# Patient Record
Sex: FEMALE | Race: WHITE | Employment: OTHER | URBAN - METROPOLITAN AREA
[De-identification: names, ages, dates, MRNs, and addresses within clinical notes are randomized per-mention and may not be internally consistent; named-entity substitution may affect disease eponyms.]

---

## 2020-03-20 ENCOUNTER — ANESTHESIA (OUTPATIENT)
Dept: ENDOSCOPY | Age: 55
DRG: 445 | End: 2020-03-20
Payer: MEDICARE

## 2020-03-20 ENCOUNTER — APPOINTMENT (OUTPATIENT)
Dept: GENERAL RADIOLOGY | Age: 55
DRG: 445 | End: 2020-03-20
Attending: INTERNAL MEDICINE
Payer: MEDICARE

## 2020-03-20 ENCOUNTER — HOSPITAL ENCOUNTER (INPATIENT)
Age: 55
LOS: 1 days | Discharge: HOME | DRG: 445 | End: 2020-03-21
Attending: INTERNAL MEDICINE | Admitting: INTERNAL MEDICINE
Payer: MEDICARE

## 2020-03-20 ENCOUNTER — ANESTHESIA EVENT (OUTPATIENT)
Dept: ENDOSCOPY | Age: 55
DRG: 445 | End: 2020-03-20
Payer: MEDICARE

## 2020-03-20 ENCOUNTER — HOSPITAL ENCOUNTER (INPATIENT)
Age: 55
LOS: 1 days | Discharge: STILL ADMITTED OR ADMINISTRATIVE | DRG: 445 | End: 2020-03-20
Attending: INTERNAL MEDICINE | Admitting: INTERNAL MEDICINE
Payer: MEDICARE

## 2020-03-20 VITALS
TEMPERATURE: 98.8 F | RESPIRATION RATE: 16 BRPM | OXYGEN SATURATION: 99 % | HEIGHT: 68 IN | DIASTOLIC BLOOD PRESSURE: 91 MMHG | BODY MASS INDEX: 30.01 KG/M2 | HEART RATE: 79 BPM | WEIGHT: 198 LBS | SYSTOLIC BLOOD PRESSURE: 162 MMHG

## 2020-03-20 VITALS — TEMPERATURE: 96.8 F | OXYGEN SATURATION: 99 % | DIASTOLIC BLOOD PRESSURE: 99 MMHG | SYSTOLIC BLOOD PRESSURE: 153 MMHG

## 2020-03-20 PROBLEM — R10.9 ACUTE ABDOMINAL PAIN: Status: ACTIVE | Noted: 2020-03-20

## 2020-03-20 PROBLEM — R10.9 ABDOMINAL PAIN: Status: ACTIVE | Noted: 2020-03-20

## 2020-03-20 LAB
ALBUMIN SERPL-MCNC: 2.5 G/DL (ref 3.4–5)
ALP BLD-CCNC: 1933 U/L (ref 40–129)
ALT SERPL-CCNC: 124 U/L (ref 10–40)
AMYLASE: 25 U/L (ref 25–115)
ANION GAP SERPL CALCULATED.3IONS-SCNC: 13 MMOL/L (ref 3–16)
AST SERPL-CCNC: 137 U/L (ref 15–37)
BILIRUB SERPL-MCNC: 7 MG/DL (ref 0–1)
BILIRUBIN DIRECT: 5.8 MG/DL (ref 0–0.3)
BILIRUBIN, INDIRECT: 1.2 MG/DL (ref 0–1)
BUN BLDV-MCNC: 15 MG/DL (ref 7–20)
CALCIUM SERPL-MCNC: 8 MG/DL (ref 8.3–10.6)
CHLORIDE BLD-SCNC: 99 MMOL/L (ref 99–110)
CO2: 26 MMOL/L (ref 21–32)
CREAT SERPL-MCNC: 0.6 MG/DL (ref 0.6–1.1)
GFR AFRICAN AMERICAN: >60
GFR NON-AFRICAN AMERICAN: >60
GLUCOSE BLD-MCNC: 121 MG/DL (ref 70–99)
HCT VFR BLD CALC: 25.2 % (ref 36–48)
HEMOGLOBIN: 8.3 G/DL (ref 12–16)
LIPASE: 27 U/L (ref 13–60)
MCH RBC QN AUTO: 30.9 PG (ref 26–34)
MCHC RBC AUTO-ENTMCNC: 32.7 G/DL (ref 31–36)
MCV RBC AUTO: 94.6 FL (ref 80–100)
PDW BLD-RTO: 20.1 % (ref 12.4–15.4)
PLATELET # BLD: 308 K/UL (ref 135–450)
PMV BLD AUTO: 8.9 FL (ref 5–10.5)
POTASSIUM SERPL-SCNC: 3.1 MMOL/L (ref 3.5–5.1)
RBC # BLD: 2.67 M/UL (ref 4–5.2)
SODIUM BLD-SCNC: 138 MMOL/L (ref 136–145)
TOTAL PROTEIN: 5.6 G/DL (ref 6.4–8.2)
WBC # BLD: 4.7 K/UL (ref 4–11)

## 2020-03-20 PROCEDURE — 2720000010 HC SURG SUPPLY STERILE: Performed by: INTERNAL MEDICINE

## 2020-03-20 PROCEDURE — 3609015100 HC ERCP STENT PLACEMENT BILIARY/PANCREATIC DUCT: Performed by: INTERNAL MEDICINE

## 2020-03-20 PROCEDURE — 6360000002 HC RX W HCPCS: Performed by: NURSE ANESTHETIST, CERTIFIED REGISTERED

## 2020-03-20 PROCEDURE — 3609014200 HC ERCP W/BIOPSY SINGLE/MULTIPLE: Performed by: INTERNAL MEDICINE

## 2020-03-20 PROCEDURE — 82150 ASSAY OF AMYLASE: CPT

## 2020-03-20 PROCEDURE — 6360000004 HC RX CONTRAST MEDICATION: Performed by: INTERNAL MEDICINE

## 2020-03-20 PROCEDURE — 3700000001 HC ADD 15 MINUTES (ANESTHESIA): Performed by: INTERNAL MEDICINE

## 2020-03-20 PROCEDURE — BF101ZZ FLUOROSCOPY OF BILE DUCTS USING LOW OSMOLAR CONTRAST: ICD-10-PCS | Performed by: INTERNAL MEDICINE

## 2020-03-20 PROCEDURE — 74330 X-RAY BILE/PANC ENDOSCOPY: CPT

## 2020-03-20 PROCEDURE — 36415 COLL VENOUS BLD VENIPUNCTURE: CPT

## 2020-03-20 PROCEDURE — 88104 CYTOPATH FL NONGYN SMEARS: CPT

## 2020-03-20 PROCEDURE — 6370000000 HC RX 637 (ALT 250 FOR IP): Performed by: INTERNAL MEDICINE

## 2020-03-20 PROCEDURE — 83690 ASSAY OF LIPASE: CPT

## 2020-03-20 PROCEDURE — 88112 CYTOPATH CELL ENHANCE TECH: CPT

## 2020-03-20 PROCEDURE — 88305 TISSUE EXAM BY PATHOLOGIST: CPT

## 2020-03-20 PROCEDURE — 3700000000 HC ANESTHESIA ATTENDED CARE: Performed by: INTERNAL MEDICINE

## 2020-03-20 PROCEDURE — 1200000000 HC SEMI PRIVATE

## 2020-03-20 PROCEDURE — 7100000001 HC PACU RECOVERY - ADDTL 15 MIN: Performed by: INTERNAL MEDICINE

## 2020-03-20 PROCEDURE — 2500000003 HC RX 250 WO HCPCS: Performed by: NURSE ANESTHETIST, CERTIFIED REGISTERED

## 2020-03-20 PROCEDURE — 2580000003 HC RX 258: Performed by: NURSE ANESTHETIST, CERTIFIED REGISTERED

## 2020-03-20 PROCEDURE — 85027 COMPLETE CBC AUTOMATED: CPT

## 2020-03-20 PROCEDURE — 6360000002 HC RX W HCPCS: Performed by: FAMILY MEDICINE

## 2020-03-20 PROCEDURE — 6370000000 HC RX 637 (ALT 250 FOR IP): Performed by: NURSE ANESTHETIST, CERTIFIED REGISTERED

## 2020-03-20 PROCEDURE — C2617 STENT, NON-COR, TEM W/O DEL: HCPCS | Performed by: INTERNAL MEDICINE

## 2020-03-20 PROCEDURE — 94760 N-INVAS EAR/PLS OXIMETRY 1: CPT

## 2020-03-20 PROCEDURE — C1769 GUIDE WIRE: HCPCS | Performed by: INTERNAL MEDICINE

## 2020-03-20 PROCEDURE — 6370000000 HC RX 637 (ALT 250 FOR IP): Performed by: FAMILY MEDICINE

## 2020-03-20 PROCEDURE — 2580000003 HC RX 258: Performed by: FAMILY MEDICINE

## 2020-03-20 PROCEDURE — 80048 BASIC METABOLIC PNL TOTAL CA: CPT

## 2020-03-20 PROCEDURE — 6360000002 HC RX W HCPCS

## 2020-03-20 PROCEDURE — 80076 HEPATIC FUNCTION PANEL: CPT

## 2020-03-20 PROCEDURE — 6360000002 HC RX W HCPCS: Performed by: INTERNAL MEDICINE

## 2020-03-20 PROCEDURE — 2709999900 HC NON-CHARGEABLE SUPPLY: Performed by: INTERNAL MEDICINE

## 2020-03-20 PROCEDURE — 6360000002 HC RX W HCPCS: Performed by: ANESTHESIOLOGY

## 2020-03-20 PROCEDURE — 74019 RADEX ABDOMEN 2 VIEWS: CPT

## 2020-03-20 PROCEDURE — 7100000000 HC PACU RECOVERY - FIRST 15 MIN: Performed by: INTERNAL MEDICINE

## 2020-03-20 PROCEDURE — C1726 CATH, BAL DIL, NON-VASCULAR: HCPCS | Performed by: INTERNAL MEDICINE

## 2020-03-20 PROCEDURE — 3609018900 HC ERCP: Performed by: INTERNAL MEDICINE

## 2020-03-20 PROCEDURE — 0F794DZ DILATION OF COMMON BILE DUCT WITH INTRALUMINAL DEVICE, PERCUTANEOUS ENDOSCOPIC APPROACH: ICD-10-PCS | Performed by: INTERNAL MEDICINE

## 2020-03-20 DEVICE — BILIARY STENT
Type: IMPLANTABLE DEVICE | Status: FUNCTIONAL
Brand: ADVANIX™ BILIARY

## 2020-03-20 RX ORDER — LIDOCAINE HYDROCHLORIDE 20 MG/ML
INJECTION, SOLUTION INFILTRATION; PERINEURAL PRN
Status: DISCONTINUED | OUTPATIENT
Start: 2020-03-20 | End: 2020-03-20 | Stop reason: SDUPTHER

## 2020-03-20 RX ORDER — LORAZEPAM 0.5 MG/1
0.5 TABLET ORAL EVERY 6 HOURS PRN
Status: DISCONTINUED | OUTPATIENT
Start: 2020-03-20 | End: 2020-03-21 | Stop reason: HOSPADM

## 2020-03-20 RX ORDER — HYDROCORTISONE 10 MG/1
20 TABLET ORAL 2 TIMES DAILY
Status: DISCONTINUED | OUTPATIENT
Start: 2020-03-20 | End: 2020-03-21 | Stop reason: HOSPADM

## 2020-03-20 RX ORDER — ONDANSETRON 4 MG/1
4 TABLET, FILM COATED ORAL EVERY 8 HOURS PRN
COMMUNITY

## 2020-03-20 RX ORDER — VALACYCLOVIR HYDROCHLORIDE 500 MG/1
500 TABLET, FILM COATED ORAL 2 TIMES DAILY
COMMUNITY

## 2020-03-20 RX ORDER — POLYETHYLENE GLYCOL 3350 17 G/17G
17 POWDER, FOR SOLUTION ORAL DAILY PRN
Status: DISCONTINUED | OUTPATIENT
Start: 2020-03-20 | End: 2020-03-21 | Stop reason: HOSPADM

## 2020-03-20 RX ORDER — ACYCLOVIR 200 MG/1
400 CAPSULE ORAL 3 TIMES DAILY
Status: DISCONTINUED | OUTPATIENT
Start: 2020-03-20 | End: 2020-03-21 | Stop reason: HOSPADM

## 2020-03-20 RX ORDER — SODIUM CHLORIDE 9 MG/ML
INJECTION, SOLUTION INTRAVENOUS CONTINUOUS PRN
Status: DISCONTINUED | OUTPATIENT
Start: 2020-03-20 | End: 2020-03-20 | Stop reason: SDUPTHER

## 2020-03-20 RX ORDER — FAMOTIDINE 20 MG/1
40 TABLET, FILM COATED ORAL DAILY
Status: DISCONTINUED | OUTPATIENT
Start: 2020-03-21 | End: 2020-03-21 | Stop reason: HOSPADM

## 2020-03-20 RX ORDER — ACETAMINOPHEN 325 MG/1
650 TABLET ORAL EVERY 6 HOURS PRN
Status: DISCONTINUED | OUTPATIENT
Start: 2020-03-20 | End: 2020-03-21 | Stop reason: HOSPADM

## 2020-03-20 RX ORDER — PROMETHAZINE HYDROCHLORIDE 25 MG/ML
INJECTION, SOLUTION INTRAMUSCULAR; INTRAVENOUS
Status: DISCONTINUED
Start: 2020-03-20 | End: 2020-03-20 | Stop reason: HOSPADM

## 2020-03-20 RX ORDER — VALACYCLOVIR HYDROCHLORIDE 500 MG/1
500 TABLET, FILM COATED ORAL 2 TIMES DAILY
Status: DISCONTINUED | OUTPATIENT
Start: 2020-03-20 | End: 2020-03-20 | Stop reason: CLARIF

## 2020-03-20 RX ORDER — PROMETHAZINE HYDROCHLORIDE 25 MG/ML
6.25 INJECTION, SOLUTION INTRAMUSCULAR; INTRAVENOUS
Status: DISCONTINUED | OUTPATIENT
Start: 2020-03-20 | End: 2020-03-20 | Stop reason: HOSPADM

## 2020-03-20 RX ORDER — FENTANYL CITRATE 50 UG/ML
INJECTION, SOLUTION INTRAMUSCULAR; INTRAVENOUS PRN
Status: DISCONTINUED | OUTPATIENT
Start: 2020-03-20 | End: 2020-03-20 | Stop reason: SDUPTHER

## 2020-03-20 RX ORDER — SODIUM CHLORIDE 9 MG/ML
INJECTION INTRAVENOUS
Status: DISCONTINUED
Start: 2020-03-20 | End: 2020-03-20 | Stop reason: HOSPADM

## 2020-03-20 RX ORDER — FAMOTIDINE 40 MG/1
40 TABLET, FILM COATED ORAL DAILY
COMMUNITY

## 2020-03-20 RX ORDER — LANOLIN ALCOHOL/MO/W.PET/CERES
20 CREAM (GRAM) TOPICAL NIGHTLY PRN
Status: DISCONTINUED | OUTPATIENT
Start: 2020-03-20 | End: 2020-03-21 | Stop reason: HOSPADM

## 2020-03-20 RX ORDER — MIDODRINE HYDROCHLORIDE 5 MG/1
2.5 TABLET ORAL DAILY
Status: DISCONTINUED | OUTPATIENT
Start: 2020-03-21 | End: 2020-03-21 | Stop reason: HOSPADM

## 2020-03-20 RX ORDER — PROMETHAZINE HYDROCHLORIDE 25 MG/1
12.5 TABLET ORAL EVERY 6 HOURS PRN
Status: DISCONTINUED | OUTPATIENT
Start: 2020-03-20 | End: 2020-03-21 | Stop reason: HOSPADM

## 2020-03-20 RX ORDER — ONDANSETRON 4 MG/1
4 TABLET, ORALLY DISINTEGRATING ORAL EVERY 8 HOURS PRN
Status: DISCONTINUED | OUTPATIENT
Start: 2020-03-20 | End: 2020-03-21 | Stop reason: HOSPADM

## 2020-03-20 RX ORDER — HYDROCORTISONE 20 MG/1
20 TABLET ORAL 2 TIMES DAILY
COMMUNITY

## 2020-03-20 RX ORDER — CIPROFLOXACIN 2 MG/ML
400 INJECTION, SOLUTION INTRAVENOUS ONCE
Status: COMPLETED | OUTPATIENT
Start: 2020-03-20 | End: 2020-03-20

## 2020-03-20 RX ORDER — DEXAMETHASONE SODIUM PHOSPHATE 4 MG/ML
INJECTION, SOLUTION INTRA-ARTICULAR; INTRALESIONAL; INTRAMUSCULAR; INTRAVENOUS; SOFT TISSUE PRN
Status: DISCONTINUED | OUTPATIENT
Start: 2020-03-20 | End: 2020-03-20 | Stop reason: SDUPTHER

## 2020-03-20 RX ORDER — CIPROFLOXACIN 500 MG/1
500 TABLET, FILM COATED ORAL 2 TIMES DAILY
Qty: 14 TABLET | Refills: 0 | Status: ON HOLD | OUTPATIENT
Start: 2020-03-20 | End: 2020-03-21 | Stop reason: HOSPADM

## 2020-03-20 RX ORDER — MIDAZOLAM HYDROCHLORIDE 1 MG/ML
1 INJECTION INTRAMUSCULAR; INTRAVENOUS EVERY 10 MIN PRN
Status: DISCONTINUED | OUTPATIENT
Start: 2020-03-20 | End: 2020-03-20 | Stop reason: HOSPADM

## 2020-03-20 RX ORDER — MIDODRINE HYDROCHLORIDE 2.5 MG/1
2.5 TABLET ORAL DAILY
COMMUNITY

## 2020-03-20 RX ORDER — VENLAFAXINE HYDROCHLORIDE 37.5 MG/1
75 CAPSULE, EXTENDED RELEASE ORAL 2 TIMES DAILY
Status: DISCONTINUED | OUTPATIENT
Start: 2020-03-20 | End: 2020-03-21 | Stop reason: HOSPADM

## 2020-03-20 RX ORDER — HYDROMORPHONE HCL 110MG/55ML
PATIENT CONTROLLED ANALGESIA SYRINGE INTRAVENOUS
Status: DISCONTINUED
Start: 2020-03-20 | End: 2020-03-20 | Stop reason: HOSPADM

## 2020-03-20 RX ORDER — ONDANSETRON 2 MG/ML
4 INJECTION INTRAMUSCULAR; INTRAVENOUS EVERY 6 HOURS PRN
Status: DISCONTINUED | OUTPATIENT
Start: 2020-03-20 | End: 2020-03-21 | Stop reason: HOSPADM

## 2020-03-20 RX ORDER — CIPROFLOXACIN 2 MG/ML
400 INJECTION, SOLUTION INTRAVENOUS EVERY 12 HOURS
Status: DISCONTINUED | OUTPATIENT
Start: 2020-03-20 | End: 2020-03-21 | Stop reason: HOSPADM

## 2020-03-20 RX ORDER — PROPOFOL 10 MG/ML
INJECTION, EMULSION INTRAVENOUS PRN
Status: DISCONTINUED | OUTPATIENT
Start: 2020-03-20 | End: 2020-03-20 | Stop reason: SDUPTHER

## 2020-03-20 RX ORDER — ACETAMINOPHEN 650 MG/1
650 SUPPOSITORY RECTAL EVERY 6 HOURS PRN
Status: DISCONTINUED | OUTPATIENT
Start: 2020-03-20 | End: 2020-03-21 | Stop reason: HOSPADM

## 2020-03-20 RX ORDER — MIDAZOLAM HYDROCHLORIDE 1 MG/ML
2 INJECTION INTRAMUSCULAR; INTRAVENOUS ONCE
Status: DISCONTINUED | OUTPATIENT
Start: 2020-03-20 | End: 2020-03-20 | Stop reason: HOSPADM

## 2020-03-20 RX ORDER — ONDANSETRON 2 MG/ML
INJECTION INTRAMUSCULAR; INTRAVENOUS PRN
Status: DISCONTINUED | OUTPATIENT
Start: 2020-03-20 | End: 2020-03-20 | Stop reason: SDUPTHER

## 2020-03-20 RX ORDER — MORPHINE SULFATE 4 MG/ML
4 INJECTION, SOLUTION INTRAMUSCULAR; INTRAVENOUS EVERY 4 HOURS PRN
Status: DISCONTINUED | OUTPATIENT
Start: 2020-03-20 | End: 2020-03-21 | Stop reason: HOSPADM

## 2020-03-20 RX ORDER — LORAZEPAM 0.5 MG/1
0.5 TABLET ORAL EVERY 6 HOURS PRN
COMMUNITY

## 2020-03-20 RX ORDER — HYDROMORPHONE HCL 110MG/55ML
0.5 PATIENT CONTROLLED ANALGESIA SYRINGE INTRAVENOUS EVERY 5 MIN PRN
Status: DISCONTINUED | OUTPATIENT
Start: 2020-03-20 | End: 2020-03-20 | Stop reason: HOSPADM

## 2020-03-20 RX ORDER — SUCCINYLCHOLINE CHLORIDE 20 MG/ML
INJECTION INTRAMUSCULAR; INTRAVENOUS PRN
Status: DISCONTINUED | OUTPATIENT
Start: 2020-03-20 | End: 2020-03-20 | Stop reason: SDUPTHER

## 2020-03-20 RX ORDER — MIDAZOLAM HYDROCHLORIDE 1 MG/ML
INJECTION INTRAMUSCULAR; INTRAVENOUS
Status: COMPLETED
Start: 2020-03-20 | End: 2020-03-20

## 2020-03-20 RX ORDER — SODIUM CHLORIDE 0.9 % (FLUSH) 0.9 %
10 SYRINGE (ML) INJECTION EVERY 12 HOURS SCHEDULED
Status: DISCONTINUED | OUTPATIENT
Start: 2020-03-20 | End: 2020-03-21 | Stop reason: HOSPADM

## 2020-03-20 RX ORDER — SODIUM CHLORIDE 0.9 % (FLUSH) 0.9 %
10 SYRINGE (ML) INJECTION PRN
Status: DISCONTINUED | OUTPATIENT
Start: 2020-03-20 | End: 2020-03-21 | Stop reason: HOSPADM

## 2020-03-20 RX ORDER — VENLAFAXINE 75 MG/1
75 TABLET ORAL 2 TIMES DAILY
COMMUNITY

## 2020-03-20 RX ORDER — ALBUTEROL SULFATE 90 UG/1
AEROSOL, METERED RESPIRATORY (INHALATION) PRN
Status: DISCONTINUED | OUTPATIENT
Start: 2020-03-20 | End: 2020-03-20 | Stop reason: SDUPTHER

## 2020-03-20 RX ADMIN — CIPROFLOXACIN 400 MG: 2 INJECTION, SOLUTION INTRAVENOUS at 22:21

## 2020-03-20 RX ADMIN — MORPHINE SULFATE 4 MG: 4 INJECTION INTRAVENOUS at 21:13

## 2020-03-20 RX ADMIN — DEXAMETHASONE SODIUM PHOSPHATE 4 MG: 4 INJECTION, SOLUTION INTRAMUSCULAR; INTRAVENOUS at 10:01

## 2020-03-20 RX ADMIN — HYDROMORPHONE HYDROCHLORIDE 0.5 MG: 2 INJECTION, SOLUTION INTRAMUSCULAR; INTRAVENOUS; SUBCUTANEOUS at 13:08

## 2020-03-20 RX ADMIN — HYDROMORPHONE HYDROCHLORIDE 0.5 MG: 2 INJECTION, SOLUTION INTRAMUSCULAR; INTRAVENOUS; SUBCUTANEOUS at 12:37

## 2020-03-20 RX ADMIN — PROPOFOL 50 MG: 10 INJECTION, EMULSION INTRAVENOUS at 10:03

## 2020-03-20 RX ADMIN — ONDANSETRON 4 MG: 2 INJECTION INTRAMUSCULAR; INTRAVENOUS at 21:19

## 2020-03-20 RX ADMIN — ENOXAPARIN SODIUM 40 MG: 40 INJECTION SUBCUTANEOUS at 21:13

## 2020-03-20 RX ADMIN — GLUCAGON HYDROCHLORIDE 0.5 MG: KIT at 10:21

## 2020-03-20 RX ADMIN — PROPOFOL 50 MG: 10 INJECTION, EMULSION INTRAVENOUS at 10:31

## 2020-03-20 RX ADMIN — Medication 10 ML: at 21:14

## 2020-03-20 RX ADMIN — VENLAFAXINE HYDROCHLORIDE 75 MG: 37.5 CAPSULE, EXTENDED RELEASE ORAL at 21:14

## 2020-03-20 RX ADMIN — HYDROCORTISONE 20 MG: 10 TABLET ORAL at 21:13

## 2020-03-20 RX ADMIN — MIDAZOLAM 1 MG: 1 INJECTION INTRAMUSCULAR; INTRAVENOUS at 12:12

## 2020-03-20 RX ADMIN — PROPOFOL 50 MG: 10 INJECTION, EMULSION INTRAVENOUS at 10:18

## 2020-03-20 RX ADMIN — LORAZEPAM 0.5 MG: 0.5 TABLET ORAL at 22:19

## 2020-03-20 RX ADMIN — SODIUM CHLORIDE: 9 INJECTION, SOLUTION INTRAVENOUS at 09:26

## 2020-03-20 RX ADMIN — SODIUM CHLORIDE: 9 INJECTION, SOLUTION INTRAVENOUS at 09:55

## 2020-03-20 RX ADMIN — HYDROMORPHONE HYDROCHLORIDE 0.5 MG: 2 INJECTION, SOLUTION INTRAMUSCULAR; INTRAVENOUS; SUBCUTANEOUS at 14:35

## 2020-03-20 RX ADMIN — ONDANSETRON 4 MG: 2 INJECTION INTRAMUSCULAR; INTRAVENOUS at 10:02

## 2020-03-20 RX ADMIN — LIDOCAINE HYDROCHLORIDE 100 MG: 20 INJECTION, SOLUTION INFILTRATION; PERINEURAL at 09:41

## 2020-03-20 RX ADMIN — ALBUTEROL SULFATE 5 PUFF: 90 AEROSOL, METERED RESPIRATORY (INHALATION) at 11:07

## 2020-03-20 RX ADMIN — CIPROFLOXACIN 400 MG: 2 INJECTION, SOLUTION INTRAVENOUS at 11:42

## 2020-03-20 RX ADMIN — PROPOFOL 50 MG: 10 INJECTION, EMULSION INTRAVENOUS at 09:43

## 2020-03-20 RX ADMIN — FENTANYL CITRATE 50 MCG: 50 INJECTION, SOLUTION INTRAMUSCULAR; INTRAVENOUS at 10:21

## 2020-03-20 RX ADMIN — PROMETHAZINE HYDROCHLORIDE 6.25 MG: 25 INJECTION INTRAMUSCULAR; INTRAVENOUS at 11:58

## 2020-03-20 RX ADMIN — MELATONIN TAB 3 MG 19.5 MG: 3 TAB at 21:13

## 2020-03-20 RX ADMIN — ACYCLOVIR 400 MG: 200 CAPSULE ORAL at 21:12

## 2020-03-20 RX ADMIN — MIDAZOLAM 1 MG: 1 INJECTION INTRAMUSCULAR; INTRAVENOUS at 12:10

## 2020-03-20 RX ADMIN — FENTANYL CITRATE 50 MCG: 50 INJECTION, SOLUTION INTRAMUSCULAR; INTRAVENOUS at 09:41

## 2020-03-20 RX ADMIN — PROPOFOL 150 MG: 10 INJECTION, EMULSION INTRAVENOUS at 09:42

## 2020-03-20 RX ADMIN — SUCCINYLCHOLINE CHLORIDE 100 MG: 20 INJECTION, SOLUTION INTRAMUSCULAR; INTRAVENOUS at 09:44

## 2020-03-20 RX ADMIN — PROPOFOL 50 MG: 10 INJECTION, EMULSION INTRAVENOUS at 10:41

## 2020-03-20 RX ADMIN — ACETAMINOPHEN 650 MG: 325 TABLET, FILM COATED ORAL at 22:19

## 2020-03-20 ASSESSMENT — PULMONARY FUNCTION TESTS
PIF_VALUE: 7
PIF_VALUE: 34
PIF_VALUE: 6
PIF_VALUE: 32
PIF_VALUE: 35
PIF_VALUE: 10
PIF_VALUE: 35
PIF_VALUE: 35
PIF_VALUE: 34
PIF_VALUE: 35
PIF_VALUE: 5
PIF_VALUE: 32
PIF_VALUE: 36
PIF_VALUE: 36
PIF_VALUE: 5
PIF_VALUE: 38
PIF_VALUE: 16
PIF_VALUE: 5
PIF_VALUE: 32
PIF_VALUE: 31
PIF_VALUE: 30
PIF_VALUE: 33
PIF_VALUE: 36
PIF_VALUE: 25
PIF_VALUE: 37
PIF_VALUE: 28
PIF_VALUE: 22
PIF_VALUE: 30
PIF_VALUE: 1
PIF_VALUE: 33
PIF_VALUE: 32
PIF_VALUE: 33
PIF_VALUE: 27
PIF_VALUE: 18
PIF_VALUE: 29
PIF_VALUE: 33
PIF_VALUE: 7
PIF_VALUE: 34
PIF_VALUE: 31
PIF_VALUE: 35
PIF_VALUE: 34
PIF_VALUE: 2
PIF_VALUE: 34
PIF_VALUE: 5
PIF_VALUE: 38
PIF_VALUE: 34
PIF_VALUE: 38
PIF_VALUE: 33
PIF_VALUE: 32
PIF_VALUE: 33
PIF_VALUE: 5
PIF_VALUE: 25
PIF_VALUE: 38
PIF_VALUE: 37
PIF_VALUE: 27
PIF_VALUE: 34
PIF_VALUE: 38
PIF_VALUE: 4
PIF_VALUE: 25
PIF_VALUE: 35
PIF_VALUE: 35
PIF_VALUE: 31
PIF_VALUE: 32
PIF_VALUE: 34
PIF_VALUE: 31
PIF_VALUE: 5
PIF_VALUE: 34
PIF_VALUE: 34
PIF_VALUE: 35
PIF_VALUE: 7
PIF_VALUE: 21
PIF_VALUE: 36
PIF_VALUE: 36
PIF_VALUE: 31
PIF_VALUE: 34
PIF_VALUE: 36
PIF_VALUE: 36
PIF_VALUE: 5
PIF_VALUE: 4
PIF_VALUE: 34
PIF_VALUE: 33
PIF_VALUE: 3
PIF_VALUE: 38
PIF_VALUE: 36
PIF_VALUE: 36
PIF_VALUE: 33
PIF_VALUE: 40
PIF_VALUE: 32
PIF_VALUE: 0
PIF_VALUE: 36
PIF_VALUE: 32
PIF_VALUE: 34
PIF_VALUE: 6
PIF_VALUE: 34
PIF_VALUE: 34
PIF_VALUE: 35
PIF_VALUE: 33
PIF_VALUE: 8
PIF_VALUE: 29
PIF_VALUE: 5
PIF_VALUE: 36
PIF_VALUE: 7
PIF_VALUE: 28
PIF_VALUE: 4
PIF_VALUE: 33
PIF_VALUE: 6
PIF_VALUE: 30
PIF_VALUE: 29

## 2020-03-20 ASSESSMENT — PAIN SCALES - GENERAL
PAINLEVEL_OUTOF10: 9
PAINLEVEL_OUTOF10: 9
PAINLEVEL_OUTOF10: 7
PAINLEVEL_OUTOF10: 8
PAINLEVEL_OUTOF10: 2
PAINLEVEL_OUTOF10: 5

## 2020-03-20 ASSESSMENT — PAIN DESCRIPTION - PAIN TYPE: TYPE: ACUTE PAIN

## 2020-03-20 ASSESSMENT — PAIN DESCRIPTION - LOCATION: LOCATION: ABDOMEN

## 2020-03-20 NOTE — H&P
[address] [place] [address]   Pre-operative History and Physical    Patient: Brianna Woo  : 1965  CSN:     History Obtained From:  patient, electronic medical record    HISTORY OF PRESENT ILLNESS:    The patient is a 63 y.o. female with significant past medical history of HTN who presents with Obstructive jaundice with liver mass encroaching on portal confluence on MRI. Here for dx and palliation. Past Medical History:        Diagnosis Date    Cancer ([facility] [address] 2013    Breast R side    Hypertension       Past Surgical History:        Procedure Laterality Date    BREAST BIOPSY Bilateral     BREAST SURGERY      bilateral mastectomy with reconstruction    COLONOSCOPY      CYST REMOVAL      neck    HYSTERECTOMY        Medications Prior to Admission:   No current facility-administered medications on file prior to encounter. Current Outpatient Medications on File Prior to Encounter   Medication Sig Dispense Refill    valACYclovir (VALTREX) 500 MG tablet Take 500 mg by mouth 2 times daily      vancomycin (VANCOCIN) 50 mg/mL oral solution Take 250 mg by mouth daily      venlafaxine (EFFEXOR) 75 MG tablet Take 75 mg by mouth 2 times daily      metoprolol tartrate (LOPRESSOR) 25 MG tablet Take 25 mg by mouth daily      midodrine (PROAMATINE) 2.5 MG tablet Take 2.5 mg by mouth daily      hydrocortisone (CORTEF) 20 MG tablet Take 20 mg by mouth 2 times daily      LORazepam (ATIVAN) 0.5 MG tablet Take 0.5 mg by mouth every 6 hours as needed for Anxiety.  ondansetron (ZOFRAN) 4 MG tablet Take 4 mg by mouth every 8 hours as needed for Nausea or Vomiting      famotidine (PEPCID) 40 MG tablet Take 40 mg by mouth daily          Allergies:  Benadryl [diphenhydramine];  Lyrica [pregabalin]; and Adhesive tape    History of allergic reaction to anesthesia:  No    Social History:   Social History     Socioeconomic History    Marital status:      Spouse name: Not on file    Number of children:

## 2020-03-20 NOTE — ANESTHESIA POSTPROCEDURE EVALUATION
Department of Anesthesiology  Postprocedure Note    Patient: Polly Stevenson  MRN: [id number]  YOB: 1965  Date of evaluation: 3/20/2020  Time:  6:28 PM     Procedure Summary     Date:  03/20/20 Room / Location:  [address] [place] [address] / [facility]    Anesthesia Start:  3061 Anesthesia Stop:  1886    Procedures:       ERCP BIOPSY (N/A )      ERCP ENDOSCOPIC RETROGRADE CHOLANGIOPANCREATOGRAPHY DIRECT VISUALIZATION (N/A )      ERCP STENT INSERTION (N/A ) Diagnosis:  (JAUNDICE R17)    Surgeon:  Katt Dickinson MD Responsible Provider:  Denzel Frank MD    Anesthesia Type:  general ASA Status:  3          Anesthesia Type: general    Maximiliano Phase I: Maximiliano Score: 10    Maximiliano Phase II:      Last vitals: Reviewed and per EMR flowsheets.        Anesthesia Post Evaluation    Patient location during evaluation: PACU  Complications: no  Cardiovascular status: hemodynamically stable  Respiratory status: acceptable

## 2020-03-20 NOTE — ANESTHESIA PRE PROCEDURE
file   Substance Use Topics    Alcohol use: Not on file                                Counseling given: Not Answered      Vital Signs (Current):   Vitals:    03/20/20 0851   BP: 118/83   Pulse: 89   Resp: 17   Temp: 97.3 °F (36.3 °C)   TempSrc: Temporal   SpO2: 97%   Weight: 198 lb (89.8 kg)   Height: 5' 8\" (1.727 m)                                              BP Readings from Last 3 Encounters:   03/20/20 118/83       NPO Status: Time of last liquid consumption: 0630(sips of water with  morning meds)                        Time of last solid consumption: 2300                        Date of last liquid consumption: 03/20/20                        Date of last solid food consumption: 03/19/20    BMI:   Wt Readings from Last 3 Encounters:   03/20/20 198 lb (89.8 kg)     Body mass index is 30.11 kg/m². CBC: No results found for: WBC, RBC, HGB, HCT, MCV, RDW, PLT    CMP: No results found for: NA, K, CL, CO2, BUN, CREATININE, GFRAA, AGRATIO, LABGLOM, GLUCOSE, PROT, CALCIUM, BILITOT, ALKPHOS, AST, ALT    POC Tests: No results for input(s): POCGLU, POCNA, POCK, POCCL, POCBUN, POCHEMO, POCHCT in the last 72 hours.     Coags: No results found for: PROTIME, INR, APTT    HCG (If Applicable): No results found for: PREGTESTUR, PREGSERUM, HCG, HCGQUANT     ABGs: No results found for: PHART, PO2ART, HWO4TYA, NJZ0WZJ, BEART, F6FOSDVI     Type & Screen (If Applicable):  No results found for: LABABO, [address]    Anesthesia Evaluation  Patient summary reviewed and Nursing notes reviewed  Airway: Mallampati: II        Dental:    (+) caps      Pulmonary:normal exam                               Cardiovascular:  Exercise tolerance: good (>4 METS),   (+) valvular problems/murmurs: MVP, hyperlipidemia      ECG reviewed  Rhythm: regular    Echocardiogram reviewed               ROS comment: EF 59%     Neuro/Psych:   (+) depression/anxiety             GI/Hepatic/Renal:   (+) liver disease:,           Endo/Other:    (+) malignancy/cancer. Abdominal:   (+) obese,         Vascular:                                      Anesthesia Plan      general     ASA 3       Induction: intravenous. MIPS: Postoperative opioids intended, Prophylactic antiemetics administered and Postoperative trial extubation. Anesthetic plan and risks discussed with patient. Plan discussed with CRNA. MEDICATIONS:  No current facility-administered medications for this encounter. LABS:  No results found for: WBC, HGB, HCT, MCV, PLT, GLUCOSE, PROTIME, INR, APTT    No results found for: NA, K, CL, CO2, PHOS, BUN, CREATININE    Problem List:  There is no problem list on file for this patient.         Juan Foss MD   3/20/2020

## 2020-03-20 NOTE — CONSULTS
Gastroenterology Consult Note      Patient: Brianna Woo  : 1965  [facility]#: [id number]     Date:  3/20/2020    Subjective:       History of Present Illness  Patient is a 63 y.o.  female admitted with Pain [R52]  Abdominal pain [R10.9] who is seen in consult for same. She underwent ERCP earlier today for jaundice. She has metastatic breast CA with large metastatic tumor burden in liver, but some evidence it is causing biliary obstruction that may be contributing to jaundice. She underwent ERC at [facility] 3/19/20 that was unsuccessful, leading to repeat procedure here today. ERCP per procedure note demostrates extrinsic compression of confluence of right and left hepatic ducts at the common hepatic duct. Brushings and biopsies were obtained and a biliary stent was placed into the Left hepatic duct. A precut papillotomy was required to access the common bile duct. Unfortunately, she developed abd pain in PACU that persisted despite several rounds of narcotic analgesia, so she is admitted for observation and pain control, to r/o complication of procedure. Past Medical History:   Diagnosis Date    Cancer ([facility] [address] 2013    Breast R side    Hypertension       Past Surgical History:   Procedure Laterality Date    BREAST BIOPSY Bilateral     BREAST SURGERY      bilateral mastectomy with reconstruction    COLONOSCOPY      CYST REMOVAL      neck    HYSTERECTOMY        Past Endoscopic History:  See HPI. Admission Meds  No current facility-administered medications on file prior to encounter.       Current Outpatient Medications on File Prior to Encounter   Medication Sig Dispense Refill    valACYclovir (VALTREX) 500 MG tablet Take 500 mg by mouth 2 times daily      vancomycin (VANCOCIN) 50 mg/mL oral solution Take 250 mg by mouth daily      venlafaxine (EFFEXOR) 75 MG tablet Take 75 mg by mouth 2 times daily      metoprolol tartrate (LOPRESSOR) 25 MG tablet Take 25 mg by mouth daily      midodrine (PROAMATINE) 2.5 MG tablet Take 2.5 mg by mouth daily      hydrocortisone (CORTEF) 20 MG tablet Take 20 mg by mouth 2 times daily      ondansetron (ZOFRAN) 4 MG tablet Take 4 mg by mouth every 8 hours as needed for Nausea or Vomiting      famotidine (PEPCID) 40 MG tablet Take 40 mg by mouth daily      LORazepam (ATIVAN) 0.5 MG tablet Take 0.5 mg by mouth every 6 hours as needed for Anxiety.  ciprofloxacin (CIPRO) 500 MG tablet Take 1 tablet by mouth 2 times daily for 7 days 14 tablet 0       Patient denies ASA, NSAID use. Allergies  Allergies   Allergen Reactions    Benadryl [Diphenhydramine]      IV med causes restless leg      Lyrica [Pregabalin]      Severe depression      Adhesive Tape Rash     Some tapes      Social   Social History     Tobacco Use    Smoking status: Never Smoker    Smokeless tobacco: Never Used   Substance Use Topics    Alcohol use: Not on file        No family history on file. No family history of colon cancer, Crohn's disease, or ulcerative colitis. Review of Systems  Pertinent items are noted in HPI. Physical Exam  Blood pressure (!) 165/80, pulse 91, temperature 98.2 °F (36.8 °C), temperature source Oral, resp. rate 16, height 5' 8\" (1.727 m), weight 194 lb (88 kg), SpO2 96 %. General appearance: alert, cooperative, no distress, appears stated age  Eyes: Icteric sclera  Head: Normocephalic, without obvious abnormality  Lungs: clear to auscultation bilaterally, Normal Effort  Heart: regular rate and rhythm, normal S1 and S2, no murmurs or rubs  Abdomen: soft, RUQ-tender. Bowel sounds normal. No masses,  no organomegaly. Extremities: atraumatic, no cyanosis or edema  Skin: warm and dry, (+)jaundice  Neuro: Grossly intact, A&OX3      Data Review:    No results for input(s): WBC, HGB, HCT, MCV, PLT in the last 72 hours. No results for input(s): NA, K, CL, CO2, PHOS, BUN, CREATININE in the last 72 hours.     Invalid input(s): CA  No results for input(s): AST, ALT, ALB, BILIDIR, BILITOT, ALKPHOS in the last 72 hours. No results for input(s): LIPASE, AMYLASE in the last 72 hours. No results for input(s): PROTIME, INR in the last 72 hours. No results for input(s): PTT in the last 72 hours. No results for input(s): OCCULTBLD in the last 72 hours. Imaging Studies:                            Abdomen xray after ERCP 3/20/2020: final report pending, but no free air nor retroperitoneal air noted. Assessment:     Active Problems:    Abdominal pain  Resolved Problems:    * No resolved hospital problems. *    Abdominal pain after ERCP - admit to r/o pancreatitis or other complication of ERCP and for pain control. She appears more comfortable this PM.  Will treat symptomatically, allow clear liquids and check labs in AM.  If continues to improve would advance diet in AM and consider discharge on her 7 day course of Cipro. Metastatic Breast CA -> to liver - cause of jaundice. She will follow up for this with Dr. Enzo Lovelace and my partner, Dr. Piero Serrnao on discharge. Recommendations:   1) Clear liquid diet. 2) IV antiemetics and analgesia, prn.  3) IVF hydration  4) Check CBC, CMP, amylase and lipase in AM  5) Consider advancing diet and D/c in AM if improved and labs ok. Thanks for your care.     Aminata Jean MD  [address]  3/20/2020

## 2020-03-20 NOTE — OP NOTE
[address] [place] [address]  Endoscopy Note    Patient: Jeff Jimenez   : 1965  CSN: [id number]    Procedure: Endoscopic retrograde cholangiopancreatography with precut papillotomy, biliary sphincterotomy, Spyglass cholangioscopy with biopsies and biliary stent placement    Date: 3/20/2020    Surgeon:  Jean Claude Raya MD    Referring Physician:  Mary Capps. Adonay     Preoperative Diagnosis:   Obstructive jaundice    Postoperative Diagnosis:   Extrinsic compression of portal confluence    Anesthesia:  General per Anesthesia. EBL: <50 mL    Indications: This is a 63y.o. year old female who presents today with Obstructive jaundice with dilated intrahepatic bile ducts and metastatic lesions in the liver. Procedure: Informed consent was obtained from the patient after explanation of indications, benefits, possible risks and complications of the procedure. The patient was then taken to the endoscopy suite. A time-out was performed. The patient and staff were in agreement as to the correct patient and procedure. The above anesthesia was administered by the anesthesia department. The patient was placed in the left lateral prone position. Vital signs and heart rhythm were monitored prior to and throughout the entire procedure. The Olympus [id number] Video therapeutic duodenoscope was placed in the patient's mouth and blindly advanced into the esophagus. The scope was then advanced through the esophagus, stomach and into the second portion of the duodenum where the major papilla was visualized. The major papilla was normal and located on the edge of a duodenal diverticulum. .      Pancreatogram:  The main pancreatic duct was then selectively cannulated and opacified to the tail. The main pancreatic duct was noted to have a normal course and caliber in the head, body and tail, with normal side branches.     Cholangiogram:  The biliary orifice was then selectively cannulated and the biliary tree was

## 2020-03-20 NOTE — PROGRESS NOTES
Teaching / education initiated regarding perioperative experience, expectations, and pain management during stay. Patient verbalized understanding. Pt arrived for procedure with port already accessed from [facility] on 3/17/20. Flushed well and blood return noted. Dressing clean, dry, and intact.

## 2020-03-21 VITALS
HEIGHT: 68 IN | DIASTOLIC BLOOD PRESSURE: 77 MMHG | HEART RATE: 84 BPM | TEMPERATURE: 97.7 F | BODY MASS INDEX: 29.4 KG/M2 | SYSTOLIC BLOOD PRESSURE: 123 MMHG | OXYGEN SATURATION: 91 % | RESPIRATION RATE: 16 BRPM | WEIGHT: 194 LBS

## 2020-03-21 LAB
ALBUMIN SERPL-MCNC: 2.2 G/DL (ref 3.4–5)
ALP BLD-CCNC: 1593 U/L (ref 40–129)
ALT SERPL-CCNC: 105 U/L (ref 10–40)
AMYLASE: 13 U/L (ref 25–115)
ANION GAP SERPL CALCULATED.3IONS-SCNC: 10 MMOL/L (ref 3–16)
ANISOCYTOSIS: ABNORMAL
AST SERPL-CCNC: 127 U/L (ref 15–37)
BANDED NEUTROPHILS RELATIVE PERCENT: 4 % (ref 0–7)
BASOPHILS ABSOLUTE: 0 K/UL (ref 0–0.2)
BASOPHILS RELATIVE PERCENT: 0 %
BILIRUB SERPL-MCNC: 7 MG/DL (ref 0–1)
BILIRUBIN DIRECT: 6.1 MG/DL (ref 0–0.3)
BILIRUBIN, INDIRECT: 0.9 MG/DL (ref 0–1)
BUN BLDV-MCNC: 10 MG/DL (ref 7–20)
CALCIUM SERPL-MCNC: 7.2 MG/DL (ref 8.3–10.6)
CHLORIDE BLD-SCNC: 107 MMOL/L (ref 99–110)
CO2: 26 MMOL/L (ref 21–32)
CREAT SERPL-MCNC: <0.5 MG/DL (ref 0.6–1.1)
EOSINOPHILS ABSOLUTE: 0 K/UL (ref 0–0.6)
EOSINOPHILS RELATIVE PERCENT: 0 %
GFR AFRICAN AMERICAN: >60
GFR NON-AFRICAN AMERICAN: >60
GLUCOSE BLD-MCNC: 103 MG/DL (ref 70–99)
HCT VFR BLD CALC: 25.2 % (ref 36–48)
HEMOGLOBIN: 8.3 G/DL (ref 12–16)
LIPASE: 10 U/L (ref 13–60)
LYMPHOCYTES ABSOLUTE: 0.2 K/UL (ref 1–5.1)
LYMPHOCYTES RELATIVE PERCENT: 4 %
MCH RBC QN AUTO: 30.9 PG (ref 26–34)
MCHC RBC AUTO-ENTMCNC: 32.8 G/DL (ref 31–36)
MCV RBC AUTO: 94.2 FL (ref 80–100)
MONOCYTES ABSOLUTE: 0.3 K/UL (ref 0–1.3)
MONOCYTES RELATIVE PERCENT: 6 %
NEUTROPHILS ABSOLUTE: 4.2 K/UL (ref 1.7–7.7)
NEUTROPHILS RELATIVE PERCENT: 86 %
NUCLEATED RED BLOOD CELLS: 1 /100 WBC
NUCLEATED RED BLOOD CELLS: 1 /100 WBC
PDW BLD-RTO: 20.8 % (ref 12.4–15.4)
PLATELET # BLD: 329 K/UL (ref 135–450)
PLATELET SLIDE REVIEW: ADEQUATE
PMV BLD AUTO: 8.3 FL (ref 5–10.5)
POLYCHROMASIA: ABNORMAL
POTASSIUM SERPL-SCNC: 3.3 MMOL/L (ref 3.5–5.1)
RBC # BLD: 2.68 M/UL (ref 4–5.2)
SLIDE REVIEW: ABNORMAL
SODIUM BLD-SCNC: 143 MMOL/L (ref 136–145)
TOTAL PROTEIN: 4.6 G/DL (ref 6.4–8.2)
TOXIC GRANULATION: PRESENT
WBC # BLD: 4.7 K/UL (ref 4–11)

## 2020-03-21 PROCEDURE — 6360000002 HC RX W HCPCS: Performed by: FAMILY MEDICINE

## 2020-03-21 PROCEDURE — 2580000003 HC RX 258: Performed by: FAMILY MEDICINE

## 2020-03-21 PROCEDURE — 6370000000 HC RX 637 (ALT 250 FOR IP): Performed by: INTERNAL MEDICINE

## 2020-03-21 PROCEDURE — 82150 ASSAY OF AMYLASE: CPT

## 2020-03-21 PROCEDURE — 80048 BASIC METABOLIC PNL TOTAL CA: CPT

## 2020-03-21 PROCEDURE — 6370000000 HC RX 637 (ALT 250 FOR IP): Performed by: FAMILY MEDICINE

## 2020-03-21 PROCEDURE — 85025 COMPLETE CBC W/AUTO DIFF WBC: CPT

## 2020-03-21 PROCEDURE — 80076 HEPATIC FUNCTION PANEL: CPT

## 2020-03-21 PROCEDURE — 83690 ASSAY OF LIPASE: CPT

## 2020-03-21 RX ADMIN — ONDANSETRON 4 MG: 2 INJECTION INTRAMUSCULAR; INTRAVENOUS at 04:00

## 2020-03-21 RX ADMIN — FAMOTIDINE 40 MG: 20 TABLET ORAL at 09:14

## 2020-03-21 RX ADMIN — Medication 10 ML: at 11:28

## 2020-03-21 RX ADMIN — MORPHINE SULFATE 4 MG: 4 INJECTION INTRAVENOUS at 08:12

## 2020-03-21 RX ADMIN — HYDROCORTISONE 20 MG: 10 TABLET ORAL at 09:14

## 2020-03-21 RX ADMIN — MORPHINE SULFATE 4 MG: 4 INJECTION INTRAVENOUS at 14:07

## 2020-03-21 RX ADMIN — Medication 10 ML: at 14:07

## 2020-03-21 RX ADMIN — VANCOMYCIN HYDROCHLORIDE 250 MG: KIT at 09:15

## 2020-03-21 RX ADMIN — ACYCLOVIR 400 MG: 200 CAPSULE ORAL at 09:15

## 2020-03-21 RX ADMIN — Medication 10 ML: at 08:12

## 2020-03-21 RX ADMIN — ACYCLOVIR 400 MG: 200 CAPSULE ORAL at 14:05

## 2020-03-21 RX ADMIN — MIDODRINE HYDROCHLORIDE 2.5 MG: 5 TABLET ORAL at 09:15

## 2020-03-21 RX ADMIN — ONDANSETRON 4 MG: 2 INJECTION INTRAMUSCULAR; INTRAVENOUS at 11:27

## 2020-03-21 RX ADMIN — METOPROLOL TARTRATE 25 MG: 25 TABLET, FILM COATED ORAL at 09:15

## 2020-03-21 RX ADMIN — CIPROFLOXACIN 400 MG: 2 INJECTION, SOLUTION INTRAVENOUS at 11:28

## 2020-03-21 RX ADMIN — MORPHINE SULFATE 4 MG: 4 INJECTION INTRAVENOUS at 04:00

## 2020-03-21 RX ADMIN — VENLAFAXINE HYDROCHLORIDE 75 MG: 37.5 CAPSULE, EXTENDED RELEASE ORAL at 09:15

## 2020-03-21 ASSESSMENT — PAIN SCALES - GENERAL
PAINLEVEL_OUTOF10: 1
PAINLEVEL_OUTOF10: 5
PAINLEVEL_OUTOF10: 1
PAINLEVEL_OUTOF10: 3
PAINLEVEL_OUTOF10: 2
PAINLEVEL_OUTOF10: 2
PAINLEVEL_OUTOF10: 1

## 2020-03-21 ASSESSMENT — PAIN DESCRIPTION - PAIN TYPE
TYPE: ACUTE PAIN

## 2020-03-21 ASSESSMENT — PAIN DESCRIPTION - DESCRIPTORS
DESCRIPTORS: DISCOMFORT

## 2020-03-21 ASSESSMENT — PAIN DESCRIPTION - ORIENTATION
ORIENTATION: RIGHT

## 2020-03-21 ASSESSMENT — PAIN DESCRIPTION - PROGRESSION: CLINICAL_PROGRESSION: GRADUALLY IMPROVING

## 2020-03-21 ASSESSMENT — PAIN DESCRIPTION - LOCATION
LOCATION: ABDOMEN

## 2020-03-21 NOTE — PROGRESS NOTES
Message sent to Dr Naveen Rangel via [employer] regarding advancing patient's diet. Awaiting response at this time.

## 2020-03-21 NOTE — PROGRESS NOTES
Discharge instructions and medications reviewed with patient. Patient voices understanding and denies further questions/ concerns at this time. Patient will make follow up appointment with Dr. Prema Wan and Dr Dominic Roche. . Patient discharged home per wheelchair with belongings.

## 2020-03-21 NOTE — PLAN OF CARE
Problem: Falls - Risk of:  Goal: Will remain free from falls  Description: Will remain free from falls  Outcome: Ongoing  Goal: Absence of physical injury  Description: Absence of physical injury  Outcome: Ongoing     Problem: Pain:  Goal: Control of acute pain  Description: Control of acute pain  3/21/2020 0518 by Fuad Patel RN  Outcome: Ongoing  3/20/2020 1624 by Jill Huntley RN  Outcome: Ongoing     Problem: Coping:  Goal: Ability to cope will improve  Description: Ability to cope will improve  Outcome: Ongoing

## 2020-03-21 NOTE — PROGRESS NOTES
Pt's diet advanced to general diet at this time. Menu given to patient to order room service. Will continue to monitor.

## 2020-03-21 NOTE — H&P
HOSPITALISTS HISTORY AND PHYSICAL    3/20/2020 9:20 PM    Patient Information:  María Alicea is a 63 y.o. female [id number]  PCP:  No primary care provider on file. (Tel: None )    Chief complaint:  No chief complaint on file. *    History of Present Illness:  Taco Back is a 63 y.o. female with h/o breast cancer ( s/p mastectomy on chemo) with mets to  liver. chronic anemia, adrenal insufficiency on cortef is admitted from PACU d/t persistent abdominal pain . She is s/p ERCP by dr Alexus Rosario for obstructive jaundice and liver mass. She is admitted for observation . Pain is now controlled. Denies nausea or vomiting. No diarrhea  REVIEW OF SYSTEMS:   Constitutional: Negative for fever,chills or night sweats  ENT: Negative for rhinorrhea, epistaxis, hoarseness, sore throat. Respiratory: Negative for shortness of breath,wheezing  Cardiovascular: Negative for chest pain, palpitations   Gastrointestinal: Negative for nausea, vomiting, diarrhea  Genitourinary: Negative for polyuria, dysuria   Hematologic/Lymphatic: Negative for bleeding tendency, easy bruising  Musculoskeletal: Negative for myalgias and arthralgias  Neurologic: Negative for confusion,dysarthria. Skin: Negative for itching,rash  Psychiatric: Negative for depression,anxiety, agitation. Endocrine: Negative for polydipsia,polyuria,heat /cold intolerance. Past Medical History:   has a past medical history of Cancer ([address] and Hypertension. Past Surgical History:   has a past surgical history that includes Hysterectomy; Breast surgery; Colonoscopy; cyst removal; and Breast biopsy (Bilateral). Medications:  No current facility-administered medications on file prior to encounter.       Current Outpatient Medications on File Prior to Encounter   Medication Sig Dispense Refill    valACYclovir (VALTREX) 500 MG tablet Take 500 mg by mouth 2 times daily      vancomycin (VANCOCIN) 50 mg/mL oral solution Take 250 mg by mouth daily      venlafaxine (EFFEXOR) 75 MG tablet Take 75 mg by mouth 2 times daily      metoprolol tartrate (LOPRESSOR) 25 MG tablet Take 25 mg by mouth daily      midodrine (PROAMATINE) 2.5 MG tablet Take 2.5 mg by mouth daily      hydrocortisone (CORTEF) 20 MG tablet Take 20 mg by mouth 2 times daily      ondansetron (ZOFRAN) 4 MG tablet Take 4 mg by mouth every 8 hours as needed for Nausea or Vomiting      famotidine (PEPCID) 40 MG tablet Take 40 mg by mouth daily      LORazepam (ATIVAN) 0.5 MG tablet Take 0.5 mg by mouth every 6 hours as needed for Anxiety.       ciprofloxacin (CIPRO) 500 MG tablet Take 1 tablet by mouth 2 times daily for 7 days 14 tablet 0     Current Facility-Administered Medications   Medication Dose Route Frequency Provider Last Rate Last Dose    LORazepam (ATIVAN) tablet 0.5 mg  0.5 mg Oral Q6H PRN MD Barb Cronin [START ON 3/21/2020] metoprolol tartrate (LOPRESSOR) tablet 25 mg  25 mg Oral Daily MD Barb Cronin [facility] ON 3/21/2020] midodrine (PROAMATINE) tablet 2.5 mg  2.5 mg Oral Daily Bhavna Kim MD        hydrocortisone (CORTEF) tablet 20 mg  20 mg Oral BID Bhavna Kim MD   20 mg at 03/20/20 2113    [START ON 3/21/2020] famotidine (PEPCID) tablet 40 mg  40 mg Oral Daily Bhavna Kim MD        ondansetron (ZOFRAN-ODT) disintegrating tablet 4 mg  4 mg Oral Q8H PRN MD Barb Cronin [facility] ON 3/21/2020] vancomycin MAIKOL CARTER [facility]) 50 MG/ML oral solution 250 mg  250 mg Oral Daily Bhavna Kim MD        venlafaxine (EFFEXOR XR) extended release capsule 75 mg  75 mg Oral BID Bhavna Kim MD   75 mg at 03/20/20 2114    ciprofloxacin (CIPRO) IVPB 400 mg  400 mg Intravenous Q12H Bhavna Kim MD        morphine injection 4 mg  4 mg Intravenous Q4H PRN Bhavna Kim MD   4 mg at 03/20/20 2113    sodium chloride flush 0.9 % injection 10 mL  10 mL Intravenous 2 times per day Bhavna Kim MD   10 mL at 03/20/20 2114    sodium intact. Alert and oriented in time, place and person. No speech or motor deficits  Psychiatry: Appropriate affect. Not agitated  Skin: Warm, dry, normal turgor, no rash    Labs:  CBC:   Lab Results   Component Value Date    WBC 4.7 03/20/2020    RBC 2.67 03/20/2020    HGB 8.3 03/20/2020    HCT 25.2 03/20/2020    MCV 94.6 03/20/2020    MCH 30.9 03/20/2020    MCHC 32.7 03/20/2020    RDW 20.1 03/20/2020     03/20/2020    MPV 8.9 03/20/2020     BMP:    Lab Results   Component Value Date     03/20/2020    K 3.1 03/20/2020    CL 99 03/20/2020    CO2 26 03/20/2020    BUN 15 03/20/2020    CREATININE 0.6 03/20/2020    CALCIUM 8.0 03/20/2020    GFRAA >60 03/20/2020    LABGLOM >60 03/20/2020    GLUCOSE 121 03/20/2020       Chest Xray:   EKG:    I visualized CXR images and EKG strips    Problem List  Active Problems:    Abdominal pain  Resolved Problems:    * No resolved hospital problems. *        Assessment/Plan:         1. Abdominal pain  S/p ERCP  Abdominal xray is neg for perf  Clear liquid diet  IV fluids  Antiemetics  Analgesics    Chronic normocytic anemia hb 8.3  On chemo  Cont to monitor    Adrenal fatigue  Cont cortef  Admit as inpatient. I anticipate hospitalization spanning more than two midnights for investigation and treatment of the above medically necessary diagnoses.       Neptali Che MD    3/20/2020 9:20 PM

## 2020-03-21 NOTE — PROGRESS NOTES
Pt able to tolerate ham and cheese omelet and cottage cheese for lunch. Denies c/o pain or nausea at this time. Will continue to monitor.

## 2020-03-21 NOTE — PROGRESS NOTES
[facility] GI  Gastroenterology Progress Note  Lily Toussaint is a 63 y.o. female patient. No diagnosis found. SUBJECTIVE:  Abd pain improved. Physical    VITALS:  /75   Pulse 91   Temp 98.1 °F (36.7 °C) (Oral)   Resp 16   Ht 5' 8\" (1.727 m)   Wt 194 lb (88 kg)   SpO2 93%   BMI 29.50 kg/m²   TEMPERATURE:  Current - Temp: 98.1 °F (36.7 °C); Max - Temp  Av.4 °F (36.3 °C)  Min: 96.8 °F (36 °C)  Max: 99 °F (37.2 °C)    Abdomen soft, ND, minimal ttp, no HSM, Bowel sounds normal   + icteric and jaundiced  RRR nl s1s2      Data      Recent Labs     20  1750 20  0615   WBC 4.7 4.7   HGB 8.3* 8.3*   HCT 25.2* 25.2*   MCV 94.6 94.2    329     Recent Labs     20  1750 20  0615    143   K 3.1* 3.3*   CL 99 107   CO2 26 26   BUN 15 10   CREATININE 0.6 <0.5*     Recent Labs     20  0615   * 127*   * 105*   BILIDIR 5.8* 6.1*   BILITOT 7.0* 7.0*   ALKPHOS 1,933* 1,593*     Recent Labs     20  17520  0615   LIPASE 27.0 10.0*   AMYLASE 25 13*             ASSESSMENT   1. Abdominal pain after ERCP-  Improved   Overall doing ok. No sign of post ercp pancreatitis. 2. Metastatic breast cancer with metastatic disease to the liver-   3. Jaundice likely due to intrahepatic metastatic disease burden. S/p ERCP with stent in left system. Unable to do right side. PLAN    1. F/u with Dr. Fracisco Soto and Tra De La Torre. Neelam Negrete. 2. Will sign off.       Patt Spurling, MD  [address]

## 2020-03-30 NOTE — DISCHARGE SUMMARY
performed as above        Recommendations:  1) Clear liquid diet today and advance to low fat diet in AM as tolerated. 2) Follow up with Dr. Allan Nolasco and Dr. Taina Paris. 3) Consider IR consultation for PTC biliary drainage if jaundice persists, but suspect persistance may indicate overwhelming tumor burden affecting liver function, rather than obstruction. 4) Cipro 500 mg po BID x 7 days. Treatments: IV hydration, antibiotics: Cipro and analgesia: Dilaudid    Discharge Exam:  See progress notes per Dr. Destinee Cummins. Disposition: home    In process/preliminary results:  Outstanding Order Results     No orders found from 2/20/2020 to 3/21/2020. Patient Instructions:   Discharge Medication List as of 3/20/2020  3:32 PM      START taking these medications    Details   ciprofloxacin (CIPRO) 500 MG tablet Take 1 tablet by mouth 2 times daily for 7 days, Disp-14 tablet, R-0Print         CONTINUE these medications which have NOT CHANGED    Details   valACYclovir (VALTREX) 500 MG tablet Take 500 mg by mouth 2 times dailyHistorical Med      vancomycin (VANCOCIN) 50 mg/mL oral solution Take 250 mg by mouth dailyHistorical Med      venlafaxine (EFFEXOR) 75 MG tablet Take 75 mg by mouth 2 times dailyHistorical Med      metoprolol tartrate (LOPRESSOR) 25 MG tablet Take 25 mg by mouth dailyHistorical Med      midodrine (PROAMATINE) 2.5 MG tablet Take 2.5 mg by mouth dailyHistorical Med      hydrocortisone (CORTEF) 20 MG tablet Take 20 mg by mouth 2 times dailyHistorical Med      LORazepam (ATIVAN) 0.5 MG tablet Take 0.5 mg by mouth every 6 hours as needed for Anxiety. Historical Med      ondansetron (ZOFRAN) 4 MG tablet Take 4 mg by mouth every 8 hours as needed for Nausea or VomitingHistorical Med      famotidine (PEPCID) 40 MG tablet Take 40 mg by mouth dailyHistorical Med           Activity: activity as tolerated  Diet: regular diet  Wound Care: none needed    Follow-up with Dr. Cookie Etienne and Dr. Siri Oneil as scheduled.     Signed:  Jacky Tong MD  [address] [place] [address] [facility] [place]   3/30/2020  2:43 PM

## 2020-04-05 NOTE — DISCHARGE SUMMARY
[address] [facility] DISCHARGE SUMMARY    Patient Demographics    Patient. Taco Back  Date of Birth. 1965  MRN. [id number]     Primary care provider. No primary care provider on file. (Tel: None)    Admit date: 3/20/2020    Discharge date (blank if same as Note Date): 3/20/2020  Note Date: 4/5/2020     Reason for Hospitalization. No chief complaint on file. Significant Findings. Active Problems:    Acute abdominal pain  Resolved Problems:    * No resolved hospital problems. *       Problems and results from this hospitalization that need follow up. 1. None *    Significant test results and incidental findings. 1. *    Invasive procedures and treatments. 1. None [facility]. Taco Back is a 63 y.o. female with h/o breast cancer ( s/p mastectomy on chemo) with mets to  liver. chronic anemia, adrenal insufficiency on cortef is admitted from PACU d/t persistent abdominal pain . She is s/p ERCP by dr Alexus Rosario for obstructive jaundice and liver mass. She is admitted for observation . Pain is now controlled. Denies nausea or vomiting. No diarrhea    2. Abdominal pain  S/p ERCP  Abdominal xray is neg for perf  Clear liquid diet  IV fluids  Antiemetics  Analgesics     Chronic normocytic anemia hb 8.3  On chemo  Cont to monitor     Adrenal fatigue  Cont cortef    The pt tolerated the diet she was dc home  Consults. None    Physical examination on discharge day. BP (!) 162/91   Pulse 79   Temp 98.8 °F (37.1 °C) (Temporal)   Resp 16   Ht 5' 8\" (1.727 m)   Wt 198 lb (89.8 kg)   SpO2 99%   BMI 30.11 kg/m²   General appearance. Alert. Looks comfortable. HEENT. Sclera clear. Moist mucus membranes. Cardiovascular. Regular rate and rhythm, normal S1, S2. No murmur. Respiratory. Not using accessory muscles. Clear to auscultation bilaterally, no wheeze.   Gastrointestinal. Abdomen soft, non-tender, not distended, normal bowel sounds  Neurology. Facial symmetry. No speech deficits. Moving all extremities equally. Extremities. No edema in lower extremities. Skin. Warm, dry, normal turgor    Condition at time of discharge stable    Medication instructions provided to patient at discharge. Medication List      CONTINUE taking these medications    famotidine 40 MG tablet  Commonly known as:  PEPCID     hydrocortisone 20 MG tablet  Commonly known as:  CORTEF     LORazepam 0.5 MG tablet  Commonly known as:  ATIVAN     metoprolol tartrate 25 MG tablet  Commonly known as:  LOPRESSOR     midodrine 2.5 MG tablet  Commonly known as:  PROAMATINE     ondansetron 4 MG tablet  Commonly known as:  ZOFRAN     valACYclovir 500 MG tablet  Commonly known as:  VALTREX     vancomycin 50 mg/mL oral solution  Commonly known as:  VANCOCIN     venlafaxine 75 MG tablet  Commonly known as:  SOFIE USMAN [facility]            Discharge recommendations given to patient. Follow Up. PCP in 1 week   Disposition. home  Activity. activity as tolerated  Diet: No diet orders on file      Spent >30 minutes in discharge process.     Signed:  Katy Rush MD     4/5/2020 1:29 PM

## (undated) DEVICE — Device: Brand: SINGLE USE SOFT BRUSH

## (undated) DEVICE — BAG U-BAG PLASTIC 10OZ MICROPORE ADHES

## (undated) DEVICE — ACCESS AND DELIVERY CATHETER: Brand: SPYSCOPE DS

## (undated) DEVICE — BLOCK BITE 48FR DENT RIM CAREGUARD

## (undated) DEVICE — SET VLV 3 PC AWS DISPOSABLE GRDIAN SCOPEVALET

## (undated) DEVICE — SYRINGE MED 50ML LUERLOCK TIP

## (undated) DEVICE — SINGLE-USE BIOPSY FORCEPS: Brand: SPYBITE

## (undated) DEVICE — CANNULATING SPHINCTEROTOME: Brand: TRUETOME DREAMWIRE 44

## (undated) DEVICE — DEVICE INFL 60ML 15ATM PRSS COOK SPHR

## (undated) DEVICE — PUSHING CATHETER: Brand: COOK

## (undated) DEVICE — PROCEDURE KIT ENDOSCP CUST

## (undated) DEVICE — Z CONVERTED USE 2271182 CUP DENT W4XL3IN D2IN GRN LEAK RESIST DBL SEAL CLSR ETCHED

## (undated) DEVICE — SYRINGE, LUER LOCK, 10ML: Brand: MEDLINE

## (undated) DEVICE — POSITIONER HD W8XH4XL8.5IN RASPBERRY FOAM SLT

## (undated) DEVICE — ELECTRODE PT RET AD L9FT HI MOIST COND ADH HYDRGEL CORDED

## (undated) DEVICE — SOLUTION IV IRRIG WATER 500ML POUR BRL ST 2F7113

## (undated) DEVICE — GUIDEWIRE ENDOSCP L260CM DIA0.018IN PANCREAS BILI HYDRPHLC

## (undated) DEVICE — QUANTUM TTC BILIARY BALLOON DILATOR: Brand: QUANTUM TTC

## (undated) DEVICE — TRIPLE LUMEN NEEDLE KNIFE: Brand: MICROKNIFE XL

## (undated) DEVICE — BW-412T DISP COMBO CLEANING BRUSH: Brand: SINGLE USE COMBINATION CLEANING BRUSH